# Patient Record
(demographics unavailable — no encounter records)

---

## 2024-11-11 NOTE — HISTORY OF PRESENT ILLNESS
[FreeTextEntry1] : SORAYA BENTON is a 43 year M here for follow up. FHx of CRC.    PMHx: None PSHx: None Prior Colonoscopy: 10/2024 with a few diverticula, otherwise wnl, repeat 10 years.  Prior EGD: 10/2024 small HH (0 cm, Hill grade IV), non-erosive gastritis.    Brief Summary: Seen 8/2024 for high risk CRC screening, was arranged. Afterwards had onset of gas/bloating, thought to be new reflux. Tried tums/pepcid but eventually needed PPI. He had an odd reaction to PPI - b/l leg soreness. PPI was continued and sx went away.   EGD/Colon as above.    Current symptoms: He reports he stopped PPI 5 days ago as an experiment. Also testing with diet - had red sauce pasta, feels bloating and indigestion. Denies heartburn.  Stomach in general feels a little "weird." Feeling a little bit more discomfort or mild urgency with BMs. No significant change in actual BMs or frequency.   Denies any major life changes or stressors.   Weekend Diet: Gnocchi w/ red sauce, egg sandwich, pretzel, avocado toast, grain bowl, vegan Chinese food (some vegetables), shabat dinner (meatballs, narayan beans)  GI ROS negative for unintentional weight loss, fevers/chills, dysphagia, reflux, abdominal pain, bloating, nausea, vomiting, early satiety, diarrhea, constipation, melena, hematochezia. Patient denies NSAID use.   Current Meds: Reviewed All: Reviewed   Relevant Exam: Well appearing